# Patient Record
Sex: MALE | Race: WHITE | URBAN - METROPOLITAN AREA
[De-identification: names, ages, dates, MRNs, and addresses within clinical notes are randomized per-mention and may not be internally consistent; named-entity substitution may affect disease eponyms.]

---

## 2017-11-16 ENCOUNTER — TELEPHONE (OUTPATIENT)
Dept: DERMATOLOGY | Age: 67
End: 2017-11-16

## 2017-11-16 NOTE — TELEPHONE ENCOUNTER
Pt c/b [phone]  Pt states:   - a couple days ago   - brown in color    - he has not received a call regarding an appt reminder   - he is very upset that he is not getting in by the end of the yr   - he always makes an appt before he leave   - would like to be seen   Please call to discuss thanks  Pt wilberto first availability and on wait list

## 2017-12-11 ENCOUNTER — OFFICE VISIT (OUTPATIENT)
Dept: DERMATOLOGY | Age: 67
End: 2017-12-11

## 2017-12-11 DIAGNOSIS — L81.4 SOLAR LENTIGO: ICD-10-CM

## 2017-12-11 DIAGNOSIS — L82.1 SK (SEBORRHEIC KERATOSIS): ICD-10-CM

## 2017-12-11 DIAGNOSIS — L24.9 IRRITANT DERMATITIS: ICD-10-CM

## 2017-12-11 DIAGNOSIS — D48.5 NEOPLASM OF UNCERTAIN BEHAVIOR OF SKIN: Primary | ICD-10-CM

## 2017-12-11 DIAGNOSIS — D18.01 CHERRY ANGIOMA: ICD-10-CM

## 2017-12-11 PROCEDURE — G8421 BMI NOT CALCULATED: HCPCS | Performed by: DERMATOLOGY

## 2017-12-11 PROCEDURE — 1036F TOBACCO NON-USER: CPT | Performed by: DERMATOLOGY

## 2017-12-11 PROCEDURE — 11100 PR BIOPSY OF SKIN LESION: CPT | Performed by: DERMATOLOGY

## 2017-12-11 PROCEDURE — 99213 OFFICE O/P EST LOW 20 MIN: CPT | Performed by: DERMATOLOGY

## 2017-12-11 PROCEDURE — G8427 DOCREV CUR MEDS BY ELIG CLIN: HCPCS | Performed by: DERMATOLOGY

## 2017-12-11 PROCEDURE — 4040F PNEUMOC VAC/ADMIN/RCVD: CPT | Performed by: DERMATOLOGY

## 2017-12-11 PROCEDURE — 3017F COLORECTAL CA SCREEN DOC REV: CPT | Performed by: DERMATOLOGY

## 2017-12-11 PROCEDURE — G8484 FLU IMMUNIZE NO ADMIN: HCPCS | Performed by: DERMATOLOGY

## 2017-12-11 PROCEDURE — 1123F ACP DISCUSS/DSCN MKR DOCD: CPT | Performed by: DERMATOLOGY

## 2017-12-11 NOTE — PROGRESS NOTES
[facility] Dermatology  Althea Domingo MD  [phone]      [place]field Conquest  1950    78 y.o. male     Date of Visit: 12/11/2017    Chief Complaint: skin lesions, dermatitis    History of Present Illness:    1. He complains of a pigmented lesion on the left forearm that has gotten darker and is changing. 2.  He complains of a newly noted pigmented lesion on the upper aspect of the forehead. 3.  He complains of several growths on the extremities. 4.  He complains of several red lesions on the trunk. 5.  Follow-up for history of dermatitishas few new patches on the left leg today. He has a history of SCC on the right leg       Review of Systems:  Skin: No new or changing moles. Past Medical History, Family History, Surgical History, Medications and Allergies reviewed. Past Medical History:   Diagnosis Date    GERD (gastroesophageal reflux disease)      Past Surgical History:   Procedure Laterality Date    COLONOSCOPY  2006    colon polyp    COLONOSCOPY  09/19/2016    UPPER GASTROINTESTINAL ENDOSCOPY  09/19/2016    [place]. Allergies   Allergen Reactions    Gluten Meal Other (See Comments)    Pcn [Penicillins]      Outpatient Prescriptions Marked as Taking for the 12/11/17 encounter (Office Visit) with Nae Martinez MD   Medication Sig Dispense Refill    Coenzyme Q10 (CO Q-10 PO) Take by mouth      fluocinonide (LIDEX) 0.05 % cream Apply to affected area twice daily for up to 2 weeks or until improved.  30 g 2    magnesium oxide (MAG-OX) 400 MG tablet Take 400 mg by mouth daily      fluticasone (FLONASE) 50 MCG/ACT nasal spray 1 spray by Nasal route daily      NONFORMULARY Take 1 tablet by mouth 2 times daily Indications: tumeric      omeprazole (PRILOSEC) 40 MG delayed release capsule Take 1 capsule by mouth daily 30 capsule 3    b complex vitamins capsule Take 1 capsule by mouth daily      Cholecalciferol (VITAMIN D) 2000 UNITS CAPS capsule Take  by

## 2017-12-11 NOTE — PATIENT INSTRUCTIONS

## 2017-12-13 ENCOUNTER — TELEPHONE (OUTPATIENT)
Dept: DERMATOLOGY | Age: 67
End: 2017-12-13

## 2017-12-14 NOTE — TELEPHONE ENCOUNTER
Reviewed results of the biopsy with the patient. Date of biopsy: 12/11/17  Site of biopsy: L forearm  Result: Bowen's Disease    Plan: Curettage, pt scheduled for 1/12/18    The patient expressed understanding of the plan.

## 2018-01-12 ENCOUNTER — OFFICE VISIT (OUTPATIENT)
Dept: DERMATOLOGY | Age: 68
End: 2018-01-12

## 2018-01-12 DIAGNOSIS — D04.62 SQUAMOUS CELL CARCINOMA IN SITU OF SKIN OF LEFT UPPER ARM: Primary | ICD-10-CM

## 2018-01-12 PROCEDURE — 17262 DSTRJ MAL LES T/A/L 1.1-2.0: CPT | Performed by: DERMATOLOGY

## 2018-07-18 ENCOUNTER — OFFICE VISIT (OUTPATIENT)
Dept: DERMATOLOGY | Age: 68
End: 2018-07-18

## 2018-07-18 DIAGNOSIS — L82.1 SK (SEBORRHEIC KERATOSIS): Primary | ICD-10-CM

## 2018-07-18 DIAGNOSIS — Z85.828 HISTORY OF SCC (SQUAMOUS CELL CARCINOMA) OF SKIN: ICD-10-CM

## 2018-07-18 PROCEDURE — 3017F COLORECTAL CA SCREEN DOC REV: CPT | Performed by: DERMATOLOGY

## 2018-07-18 PROCEDURE — 1101F PT FALLS ASSESS-DOCD LE1/YR: CPT | Performed by: DERMATOLOGY

## 2018-07-18 PROCEDURE — 1123F ACP DISCUSS/DSCN MKR DOCD: CPT | Performed by: DERMATOLOGY

## 2018-07-18 PROCEDURE — G8421 BMI NOT CALCULATED: HCPCS | Performed by: DERMATOLOGY

## 2018-07-18 PROCEDURE — 1036F TOBACCO NON-USER: CPT | Performed by: DERMATOLOGY

## 2018-07-18 PROCEDURE — 4040F PNEUMOC VAC/ADMIN/RCVD: CPT | Performed by: DERMATOLOGY

## 2018-07-18 PROCEDURE — 99213 OFFICE O/P EST LOW 20 MIN: CPT | Performed by: DERMATOLOGY

## 2018-07-18 PROCEDURE — G8427 DOCREV CUR MEDS BY ELIG CLIN: HCPCS | Performed by: DERMATOLOGY

## 2018-07-18 NOTE — PROGRESS NOTES
of the hands, lower extremities with stuck on appearing verrucous brown papules and thin plaques. 2.  Left dorsum - round smooth brown patch. Assessment and Plan     1. SK (seborrheic keratosis) - multiple    Reassurance. 2. History of SCC (squamous cell carcinoma) of skin - clear    Sun protective behaviors and self skin examinations were encouraged. Call for any new or concerning lesions. Return in about 6 months (around 1/18/2019).

## 2019-01-21 ENCOUNTER — OFFICE VISIT (OUTPATIENT)
Dept: DERMATOLOGY | Age: 69
End: 2019-01-21
Payer: MEDICARE

## 2019-01-21 DIAGNOSIS — L81.4 SOLAR LENTIGO: Primary | ICD-10-CM

## 2019-01-21 DIAGNOSIS — L82.1 SK (SEBORRHEIC KERATOSIS): ICD-10-CM

## 2019-01-21 DIAGNOSIS — L11.1 GROVER'S DISEASE: ICD-10-CM

## 2019-01-21 DIAGNOSIS — Z85.828 HISTORY OF SCC (SQUAMOUS CELL CARCINOMA) OF SKIN: ICD-10-CM

## 2019-01-21 PROCEDURE — 4040F PNEUMOC VAC/ADMIN/RCVD: CPT | Performed by: DERMATOLOGY

## 2019-01-21 PROCEDURE — 1123F ACP DISCUSS/DSCN MKR DOCD: CPT | Performed by: DERMATOLOGY

## 2019-01-21 PROCEDURE — 3017F COLORECTAL CA SCREEN DOC REV: CPT | Performed by: DERMATOLOGY

## 2019-01-21 PROCEDURE — 1036F TOBACCO NON-USER: CPT | Performed by: DERMATOLOGY

## 2019-01-21 PROCEDURE — 1101F PT FALLS ASSESS-DOCD LE1/YR: CPT | Performed by: DERMATOLOGY

## 2019-01-21 PROCEDURE — G8421 BMI NOT CALCULATED: HCPCS | Performed by: DERMATOLOGY

## 2019-01-21 PROCEDURE — G8427 DOCREV CUR MEDS BY ELIG CLIN: HCPCS | Performed by: DERMATOLOGY

## 2019-01-21 PROCEDURE — G8484 FLU IMMUNIZE NO ADMIN: HCPCS | Performed by: DERMATOLOGY

## 2019-01-21 PROCEDURE — 99213 OFFICE O/P EST LOW 20 MIN: CPT | Performed by: DERMATOLOGY

## 2019-01-21 RX ORDER — TRIAMCINOLONE ACETONIDE 1 MG/G
CREAM TOPICAL
Qty: 80 G | Refills: 2 | Status: SHIPPED | OUTPATIENT
Start: 2019-01-21 | End: 2019-07-24 | Stop reason: SDUPTHER

## 2019-07-24 ENCOUNTER — OFFICE VISIT (OUTPATIENT)
Dept: DERMATOLOGY | Age: 69
End: 2019-07-24
Payer: MEDICARE

## 2019-07-24 DIAGNOSIS — D18.01 CHERRY ANGIOMA: ICD-10-CM

## 2019-07-24 DIAGNOSIS — L30.9 DERMATITIS: Primary | ICD-10-CM

## 2019-07-24 DIAGNOSIS — Z85.828 HISTORY OF SCC (SQUAMOUS CELL CARCINOMA) OF SKIN: ICD-10-CM

## 2019-07-24 DIAGNOSIS — L81.4 SOLAR LENTIGO: ICD-10-CM

## 2019-07-24 DIAGNOSIS — L82.1 SK (SEBORRHEIC KERATOSIS): ICD-10-CM

## 2019-07-24 PROCEDURE — 1036F TOBACCO NON-USER: CPT | Performed by: DERMATOLOGY

## 2019-07-24 PROCEDURE — 1123F ACP DISCUSS/DSCN MKR DOCD: CPT | Performed by: DERMATOLOGY

## 2019-07-24 PROCEDURE — 3017F COLORECTAL CA SCREEN DOC REV: CPT | Performed by: DERMATOLOGY

## 2019-07-24 PROCEDURE — G8421 BMI NOT CALCULATED: HCPCS | Performed by: DERMATOLOGY

## 2019-07-24 PROCEDURE — 4040F PNEUMOC VAC/ADMIN/RCVD: CPT | Performed by: DERMATOLOGY

## 2019-07-24 PROCEDURE — 99213 OFFICE O/P EST LOW 20 MIN: CPT | Performed by: DERMATOLOGY

## 2019-07-24 PROCEDURE — G8427 DOCREV CUR MEDS BY ELIG CLIN: HCPCS | Performed by: DERMATOLOGY

## 2019-07-24 RX ORDER — TRIAMCINOLONE ACETONIDE 1 MG/G
CREAM TOPICAL
Qty: 80 G | Refills: 2 | Status: SHIPPED | OUTPATIENT
Start: 2019-07-24 | End: 2021-07-28 | Stop reason: SDUPTHER

## 2019-07-24 NOTE — PROGRESS NOTES
daily      fluticasone (FLONASE) 50 MCG/ACT nasal spray 1 spray by Nasal route daily      NONFORMULARY Take 1 tablet by mouth 2 times daily Indications: tumeric      omeprazole (PRILOSEC) 40 MG delayed release capsule Take 1 capsule by mouth daily 30 capsule 3    b complex vitamins capsule Take 1 capsule by mouth daily      Cholecalciferol (VITAMIN D) 2000 UNITS CAPS capsule Take  by mouth. Physical Examination       The following were examined and determined to be normal: Psych/Neuro, Scalp/hair, Head/face, Conjunctivae/eyelids, Gums/teeth/lips, Neck, Breast/axilla/chest, Abdomen, Back, RUE, LUE and Nails/digits. The following were examined and determined to be abnormal: RLE and LLE. Well appearing. 1.  Lower legs with faint pink slightly scaly macules. 2.  Left temple with a stuck on appearing waxy brown papule. Left lateral chest wall with few stuck appearing oval-shaped verrucous brown papules and plaques. 3.  Extremities with well-defined round oval smooth brown patches. 4.  Left temporal scalp with a round smooth erythematous to violaceous papule. 5.  Clear. Assessment and Plan     1. Dermatitis of the legs, resolving    Triamcinolone 0.1% cream twice daily for up to 2 weeks or until improved. 2. SK (seborrheic keratosis)     Reassurance. 3. Solar lentigines    Monitor for change. 4. Cherry angioma     Reassurance. 5. History of SCC (squamous cell carcinoma) of skin - clear    Sun protective behaviors and self skin examinations were encouraged. Call for any new or concerning lesions. Return in about 1 year (around 7/24/2020).

## 2020-07-29 ENCOUNTER — OFFICE VISIT (OUTPATIENT)
Dept: DERMATOLOGY | Age: 70
End: 2020-07-29
Payer: MEDICARE

## 2020-07-29 VITALS — TEMPERATURE: 97.9 F

## 2020-07-29 PROCEDURE — 3017F COLORECTAL CA SCREEN DOC REV: CPT | Performed by: DERMATOLOGY

## 2020-07-29 PROCEDURE — G8427 DOCREV CUR MEDS BY ELIG CLIN: HCPCS | Performed by: DERMATOLOGY

## 2020-07-29 PROCEDURE — 99213 OFFICE O/P EST LOW 20 MIN: CPT | Performed by: DERMATOLOGY

## 2020-07-29 PROCEDURE — G8421 BMI NOT CALCULATED: HCPCS | Performed by: DERMATOLOGY

## 2020-07-29 PROCEDURE — 4040F PNEUMOC VAC/ADMIN/RCVD: CPT | Performed by: DERMATOLOGY

## 2020-07-29 PROCEDURE — 1123F ACP DISCUSS/DSCN MKR DOCD: CPT | Performed by: DERMATOLOGY

## 2020-07-29 PROCEDURE — 1036F TOBACCO NON-USER: CPT | Performed by: DERMATOLOGY

## 2020-07-29 NOTE — PROGRESS NOTES
[facility] Dermatology  Anila Renee MD  [phone]      Donn Clements  1950    72 y.o. male     Date of Visit: 7/29/2020    Chief Complaint: skin lesions    History of Present Illness:    1. He complains of a stable asymptomatic lesion on the left temple. 2.  Has multiple persistent unchanging pigmented lesions on the extremities. 3.  He reports few asymptomatic lesions on the right lateral wrist that appeared after a bout with poison ivy. 4.  He has a history of squamous cell carcinomas of the skin-denies any signs of recurrence. 1.2 cm SCC in situ on the left forearm-treated with curettage on 1/12/2018. He has a history of SCC on the right leg. Review of Systems:  Skin: No new or changing moles. Past Medical History, Family History, Surgical History, Medications and Allergies reviewed. Past Medical History:   Diagnosis Date    Cancer [facility])     Prostate    GERD (gastroesophageal reflux disease)      Past Surgical History:   Procedure Laterality Date    COLONOSCOPY  2006    colon polyp    COLONOSCOPY  09/19/2016    PROSTATECTOMY  01/2019 1/2019    UPPER GASTROINTESTINAL ENDOSCOPY  09/19/2016    [place]. Allergies   Allergen Reactions    Gluten Meal Other (See Comments)    Pcn [Penicillins]      Outpatient Medications Marked as Taking for the 7/29/20 encounter (Office Visit) with Dyllan Salmon MD   Medication Sig Dispense Refill    triamcinolone (KENALOG) 0.1 % cream Apply to itchy areas on the skin twice daily for up to 2 weeks or until improved.  80 g 2    Coenzyme Q10 (CO Q-10 PO) Take by mouth      magnesium oxide (MAG-OX) 400 MG tablet Take 400 mg by mouth daily      fluticasone (FLONASE) 50 MCG/ACT nasal spray 1 spray by Nasal route daily      NONFORMULARY Take 1 tablet by mouth 2 times daily Indications: tumeric      omeprazole (PRILOSEC) 40 MG delayed release capsule Take 1 capsule by mouth daily 30 capsule 3    b complex vitamins capsule Take 1 capsule by mouth daily      Cholecalciferol (VITAMIN D) 2000 UNITS CAPS capsule Take  by mouth. Physical Examination       The following were examined and determined to be normal: Psych/Neuro, Scalp/hair, Head/face, Conjunctivae/eyelids, Gums/teeth/lips, Neck, Breast/axilla/chest, Abdomen, Back, LUE, RLE, LLE and Nails/digits. The following were examined and determined to be abnormal: RUE. Well appearing. 1.  Left temple - stuck on appearing waxy brown papule. 2.  Extremities - well defined stuck on appearing round patches. 3.  Right lateral wrist - linear brown patch with multiple 1 mm smooth white papules. 4.  Clear. Assessment and Plan     1. SK (seborrheic keratosis)     Reassurance. 2. Solar lentigines    Monitor for change. 3. Milia en plaque    Reassurance. 4. History of SCC (squamous cell carcinoma) of skin - clear    Sun protective behaviors and self skin examinations were encouraged. Call for any new or concerning lesions. Return in about 1 year (around 7/29/2021).

## 2021-07-28 ENCOUNTER — OFFICE VISIT (OUTPATIENT)
Dept: DERMATOLOGY | Age: 71
End: 2021-07-28
Payer: MEDICARE

## 2021-07-28 VITALS — TEMPERATURE: 98 F

## 2021-07-28 DIAGNOSIS — L29.9 PRURITUS: Primary | ICD-10-CM

## 2021-07-28 DIAGNOSIS — D48.5 NEOPLASM OF UNCERTAIN BEHAVIOR OF SKIN: ICD-10-CM

## 2021-07-28 DIAGNOSIS — L82.1 SK (SEBORRHEIC KERATOSIS): ICD-10-CM

## 2021-07-28 DIAGNOSIS — Z85.828 HISTORY OF SCC (SQUAMOUS CELL CARCINOMA) OF SKIN: ICD-10-CM

## 2021-07-28 PROCEDURE — G8427 DOCREV CUR MEDS BY ELIG CLIN: HCPCS | Performed by: DERMATOLOGY

## 2021-07-28 PROCEDURE — 99213 OFFICE O/P EST LOW 20 MIN: CPT | Performed by: DERMATOLOGY

## 2021-07-28 PROCEDURE — 1036F TOBACCO NON-USER: CPT | Performed by: DERMATOLOGY

## 2021-07-28 PROCEDURE — 3017F COLORECTAL CA SCREEN DOC REV: CPT | Performed by: DERMATOLOGY

## 2021-07-28 PROCEDURE — 1123F ACP DISCUSS/DSCN MKR DOCD: CPT | Performed by: DERMATOLOGY

## 2021-07-28 PROCEDURE — 4040F PNEUMOC VAC/ADMIN/RCVD: CPT | Performed by: DERMATOLOGY

## 2021-07-28 PROCEDURE — 11102 TANGNTL BX SKIN SINGLE LES: CPT | Performed by: DERMATOLOGY

## 2021-07-28 PROCEDURE — G8421 BMI NOT CALCULATED: HCPCS | Performed by: DERMATOLOGY

## 2021-07-28 RX ORDER — TRIAMCINOLONE ACETONIDE 1 MG/G
CREAM TOPICAL
Qty: 80 G | Refills: 2 | Status: SHIPPED | OUTPATIENT
Start: 2021-07-28

## 2021-07-28 NOTE — PATIENT INSTRUCTIONS
Biopsy Wound Care Instructions    · Keep the bandage in place for 24 hours. · Cleanse the wound with mild soapy water daily   Gently dry the area.  Apply Vaseline or petroleum jelly to the wound using a cotton tipped applicator.  Cover with a clean bandage.  Repeat this process until the biopsy site is healed.  If you had stitches placed, continue treating the site until the stitches are removed. Remember to make an appointment to return to have your stitches removed by our staff.  You may shower and bathe as usual.       ** Biopsy results generally take around 7 business days to come back. If you have not heard from us by then, please call the office at [phone]. *Please note that biopsy results are released to both the patient and physician at the same time in [address] Please allow time for your physician to review the results. One of our staff members will reach out to you with the results and plan.

## 2021-07-30 LAB — DERMATOLOGY PATHOLOGY REPORT: ABNORMAL

## 2021-08-06 ENCOUNTER — PROCEDURE VISIT (OUTPATIENT)
Dept: DERMATOLOGY | Age: 71
End: 2021-08-06
Payer: MEDICARE

## 2021-08-06 VITALS — TEMPERATURE: 98.2 F

## 2021-08-06 DIAGNOSIS — C44.42 SCC (SQUAMOUS CELL CARCINOMA), SCALP/NECK: Primary | ICD-10-CM

## 2021-08-06 PROCEDURE — 17270 DSTR MAL LES S/N/H/F/G .5 /<: CPT | Performed by: DERMATOLOGY

## 2021-08-06 NOTE — PROGRESS NOTES
[facility] Dermatology  Tarun Levine MD  [phone]      Dillon Correa  1950    80 y.o. male     Date of Visit: 8/6/2021    Chief Complaint: SCC    History of Present Illness:    Here today for treatment of a small SCC on the right lateral neck. RESULTS   DIAGNOSIS   DIAGNOSIS:     RIGHT LATERAL NECK-     Squamous cell carcinoma, moderately differentiated     RESULTS Ramirez Pelaez MD   Electronic Signature: 30 JUL 2021 12:32 PM       Review of Systems:  Gen: Feels well, good sense of health. Past Medical History, Family History, Surgical History, Medications and Allergies reviewed. Past Medical History:   Diagnosis Date    Cancer [facility])     Prostate    GERD (gastroesophageal reflux disease)      Past Surgical History:   Procedure Laterality Date    COLONOSCOPY  2006    colon polyp    COLONOSCOPY  09/19/2016    PROSTATECTOMY  01/2019 1/2019    UPPER GASTROINTESTINAL ENDOSCOPY  09/19/2016    [place]. Allergies   Allergen Reactions    Gluten Meal Other (See Comments)    Pcn [Penicillins]      Outpatient Medications Marked as Taking for the 8/6/21 encounter (Procedure visit) with Jocelynn Couch MD   Medication Sig Dispense Refill    triamcinolone (KENALOG) 0.1 % cream Apply to itchy areas on the skin twice daily for up to 2 weeks or until improved. 80 g 2    Coenzyme Q10 (CO Q-10 PO) Take by mouth      magnesium oxide (MAG-OX) 400 MG tablet Take 400 mg by mouth daily      fluticasone (FLONASE) 50 MCG/ACT nasal spray 1 spray by Nasal route daily      NONFORMULARY Take 1 tablet by mouth 2 times daily Indications: tumeric      omeprazole (PRILOSEC) 40 MG delayed release capsule Take 1 capsule by mouth daily 30 capsule 3    b complex vitamins capsule Take 1 capsule by mouth daily      Cholecalciferol (VITAMIN D) 2000 UNITS CAPS capsule Take  by mouth. Physical Examination       Well appearing.     1. Right lateral neck - 5 mm crusted pink

## 2022-07-28 ENCOUNTER — OFFICE VISIT (OUTPATIENT)
Dept: DERMATOLOGY | Age: 72
End: 2022-07-28
Payer: MEDICARE

## 2022-07-28 DIAGNOSIS — L82.1 SK (SEBORRHEIC KERATOSIS): Primary | ICD-10-CM

## 2022-07-28 DIAGNOSIS — L81.4 SOLAR LENTIGO: ICD-10-CM

## 2022-07-28 DIAGNOSIS — Z85.828 HISTORY OF SCC (SQUAMOUS CELL CARCINOMA) OF SKIN: ICD-10-CM

## 2022-07-28 PROCEDURE — 1123F ACP DISCUSS/DSCN MKR DOCD: CPT | Performed by: DERMATOLOGY

## 2022-07-28 PROCEDURE — 99213 OFFICE O/P EST LOW 20 MIN: CPT | Performed by: DERMATOLOGY

## 2022-07-28 RX ORDER — LISINOPRIL 10 MG/1
TABLET ORAL
COMMUNITY
Start: 2022-05-01

## 2022-07-28 RX ORDER — ESOMEPRAZOLE MAGNESIUM 40 MG/1
40 FOR SUSPENSION ORAL DAILY
COMMUNITY

## 2022-07-28 NOTE — PROGRESS NOTES
[facility] Dermatology  Franklyn Saleh MD  [phone]      Everardo Lyman  1950    71 y.o. male     Date of Visit: 7/28/2022    Chief Complaint:  skin lesions    History of Present Illness:    He has several stable pigmented lesions on the neck and extremities. 2.  He reports longstanding stable lesions on the chest and left upper arm. 3.  He has a history of cutaneous SCC-denies any signs of recurrence. Small SCC on the right lateral neck-treated with electrodesiccation and curettage on 8/6/2021. 1.2 cm SCC in situ on the left forearm-treated with curettage on 1/12/2018. He has a history of SCC on the right leg. Review of Systems:  Gen: Feels well, good sense of health. Past Medical History, Family History, Surgical History, Medications and Allergies reviewed. Past Medical History:   Diagnosis Date    Cancer [facility])     Prostate    GERD (gastroesophageal reflux disease)      Past Surgical History:   Procedure Laterality Date    COLONOSCOPY  2006    colon polyp    COLONOSCOPY  09/19/2016    PROSTATECTOMY  01/2019 1/2019    UPPER GASTROINTESTINAL ENDOSCOPY  09/19/2016    [place]retts. Allergies   Allergen Reactions    Gluten Meal Other (See Comments)    Pcn [Penicillins]      Outpatient Medications Marked as Taking for the 7/28/22 encounter (Office Visit) with Joya Marie MD   Medication Sig Dispense Refill    lisinopril (PRINIVIL;ZESTRIL) 10 MG tablet TAKE ONE TABLET BY MOUTH DAILY      esomeprazole Magnesium (NEXIUM) 40 MG PACK Take 40 mg by mouth in the morning. triamcinolone (KENALOG) 0.1 % cream Apply to itchy areas on the skin twice daily for up to 2 weeks or until improved.  80 g 2    Coenzyme Q10 (CO Q-10 PO) Take by mouth      magnesium oxide (MAG-OX) 400 MG tablet Take 400 mg by mouth daily      fluticasone (FLONASE) 50 MCG/ACT nasal spray 1 spray by Nasal route daily      NONFORMULARY Take 1 tablet by mouth 2 times daily Indications: tumeric      b complex vitamins capsule Take 1 capsule by mouth daily      Cholecalciferol (VITAMIN D) 2000 UNITS CAPS capsule Take  by mouth. Physical Examination       The following were examined and determined to be normal: Psych/Neuro, Scalp/hair, Head/face, Conjunctivae/eyelids, Gums/teeth/lips, Neck, Breast/axilla/chest, Abdomen, Back, RUE, LUE, RLE, LLE, and Nails/digits. The following were examined and determined to be abnormal: None. Well-appearing. 1.  Lateral aspects of the neck, upper extremities, lower extremities with stuck on appearing verrucous tan papules and plaques. 2.  Extremities, left upper chest and left upper arm with several well-defined round smooth brown macules and patches. 3.  Clear. Assessment and Plan     1. SK (seborrheic keratosis) - multiple    Reassurance. 2. Solar lentigines    Monitor for change. Sun protective behaviors encouraged including use of at least SPF 30 or more sunscreen. 3. History of SCC (squamous cell carcinoma) of skin - clear    Sun protective behaviors, including use of at least SPF 30 sunscreen, and self skin examinations were encouraged. Call for any new or concerning lesions. Return in about 1 year (around 7/28/2023).     --Cachorro Bullock MD

## 2023-02-16 ENCOUNTER — HOSPITAL ENCOUNTER (OUTPATIENT)
Dept: GENERAL RADIOLOGY | Age: 73
Discharge: HOME | End: 2023-02-16
Payer: MEDICARE

## 2023-02-16 DIAGNOSIS — K22.70 BARRETT'S ESOPHAGUS WITHOUT DYSPLASIA: ICD-10-CM

## 2023-02-16 DIAGNOSIS — K21.00 GASTROESOPHAGEAL REFLUX DISEASE WITH ESOPHAGITIS WITHOUT HEMORRHAGE: ICD-10-CM

## 2023-02-16 PROCEDURE — 74246 X-RAY XM UPR GI TRC 2CNTRST: CPT

## 2023-04-04 ENCOUNTER — PATIENT MESSAGE (OUTPATIENT)
Dept: DERMATOLOGY | Age: 73
End: 2023-04-04

## 2023-04-05 NOTE — TELEPHONE ENCOUNTER
From: Giorgi Hammonds  To: Dr. Colt Franco  Sent: 4/4/2023 11:24 PM EDT  Subject: Spot on head    Hi dr Tana Casillas, I found this spot on my head. Its slightly raised and am wondering if this is something that should be seen before my annual visit in July. I am attaching a picture.  Thanks, jose Raymond

## 2023-04-05 NOTE — TELEPHONE ENCOUNTER
Called and left message for patient to call back office. Please offer opening for 4/6/23 if still available.

## 2023-04-06 ENCOUNTER — OFFICE VISIT (OUTPATIENT)
Dept: DERMATOLOGY | Age: 73
End: 2023-04-06
Payer: MEDICARE

## 2023-04-06 DIAGNOSIS — L82.0 INFLAMED SEBORRHEIC KERATOSIS: Primary | ICD-10-CM

## 2023-04-06 DIAGNOSIS — L81.4 SOLAR LENTIGO: ICD-10-CM

## 2023-04-06 PROCEDURE — G8427 DOCREV CUR MEDS BY ELIG CLIN: HCPCS | Performed by: DERMATOLOGY

## 2023-04-06 PROCEDURE — 1036F TOBACCO NON-USER: CPT | Performed by: DERMATOLOGY

## 2023-04-06 PROCEDURE — 17110 DESTRUCTION B9 LES UP TO 14: CPT | Performed by: DERMATOLOGY

## 2023-04-06 PROCEDURE — 99212 OFFICE O/P EST SF 10 MIN: CPT | Performed by: DERMATOLOGY

## 2023-04-06 PROCEDURE — 3017F COLORECTAL CA SCREEN DOC REV: CPT | Performed by: DERMATOLOGY

## 2023-04-06 PROCEDURE — 1123F ACP DISCUSS/DSCN MKR DOCD: CPT | Performed by: DERMATOLOGY

## 2023-04-06 PROCEDURE — G8421 BMI NOT CALCULATED: HCPCS | Performed by: DERMATOLOGY

## 2023-04-06 NOTE — PROGRESS NOTES
by mouth daily      Cholecalciferol (VITAMIN D) 2000 UNITS CAPS capsule Take  by mouth. Physical Examination       Well appearing. 1.  Left crown of the scalp - stuck on appearing verrucous pink brown papule. 2.  Upper forehead/frontal scalp - several round smooth light brown macules. Assessment and Plan     1. Inflamed seborrheic keratosis - 1    Cryotherapy was discussed and patient agreed to proceed. Consent was obtained. 1 lesions were treated cryotherapy: left crown of the scalp. 2 cycles of liquid nitrogen applied to each lesion for 5 seconds using a Deep Glint-Ac cryo spray gun. Patient was educated regarding the potential risks of blister formation and discomfort. Wound care was discussed. The patient tolerated the procedure well and there were no immediate complications. 2. Solar lentigines    Reassurance. Few treated with 2 short cycles of LN2 - discussed risk of hypopigmentation.            --Steve Drew MD

## 2023-07-27 ENCOUNTER — OFFICE VISIT (OUTPATIENT)
Dept: DERMATOLOGY | Age: 73
End: 2023-07-27
Payer: MEDICARE

## 2023-07-27 DIAGNOSIS — L81.4 SOLAR LENTIGINOSIS: Primary | ICD-10-CM

## 2023-07-27 DIAGNOSIS — Z85.828 HISTORY OF SCC (SQUAMOUS CELL CARCINOMA) OF SKIN: ICD-10-CM

## 2023-07-27 DIAGNOSIS — L82.1 SK (SEBORRHEIC KERATOSIS): ICD-10-CM

## 2023-07-27 PROCEDURE — 1036F TOBACCO NON-USER: CPT | Performed by: DERMATOLOGY

## 2023-07-27 PROCEDURE — G8421 BMI NOT CALCULATED: HCPCS | Performed by: DERMATOLOGY

## 2023-07-27 PROCEDURE — 99213 OFFICE O/P EST LOW 20 MIN: CPT | Performed by: DERMATOLOGY

## 2023-07-27 PROCEDURE — 1123F ACP DISCUSS/DSCN MKR DOCD: CPT | Performed by: DERMATOLOGY

## 2023-07-27 PROCEDURE — 3017F COLORECTAL CA SCREEN DOC REV: CPT | Performed by: DERMATOLOGY

## 2023-07-27 PROCEDURE — G8427 DOCREV CUR MEDS BY ELIG CLIN: HCPCS | Performed by: DERMATOLOGY

## 2023-07-27 NOTE — PROGRESS NOTES
[facility] Dermatology  Stephani Bernabe MD  [phone]      John Gómez  1950    68 y.o. male     Date of Visit: 7/27/2023    Chief Complaint: skin lesions    History of Present Illness:    1. He has stable freckling on the shoulders and extremities. 2.  He also reports a persistent growth on the left medial chest.    3.  He has a history of cutaneous SCC's-denies any signs of recurrence. Derm Hx:  Small SCC on the right lateral neck-treated with electrodesiccation and curettage on 8/6/2021. 1.2 cm SCC in situ on the left forearm-treated with curettage on 1/12/2018. He has a history of SCC on the right leg. Review of Systems:  Gen: Feels well, good sense of health. Past Medical History, Family History, Surgical History, Medications and Allergies reviewed. Past Medical History:   Diagnosis Date    Cancer [facility])     Prostate    GERD (gastroesophageal reflux disease)      Past Surgical History:   Procedure Laterality Date    COLONOSCOPY  2006    colon polyp    COLONOSCOPY  09/19/2016    PROSTATECTOMY  01/2019 1/2019    UPPER GASTROINTESTINAL ENDOSCOPY  09/19/2016    [place]retts. Allergies   Allergen Reactions    Pcn [Penicillins]      childhood    Gluten Meal Other (See Comments)     GI problems     Outpatient Medications Marked as Taking for the 7/27/23 encounter (Office Visit) with Anam Melchor MD   Medication Sig Dispense Refill    lisinopril (PRINIVIL;ZESTRIL) 10 MG tablet TAKE ONE TABLET BY MOUTH DAILY      esomeprazole Magnesium (NEXIUM) 40 MG PACK Take 1 packet by mouth daily      triamcinolone (KENALOG) 0.1 % cream Apply to itchy areas on the skin twice daily for up to 2 weeks or until improved.  80 g 2    Coenzyme Q10 (CO Q-10 PO) Take by mouth      magnesium oxide (MAG-OX) 400 MG tablet Take 1 tablet by mouth daily      fluticasone (FLONASE) 50 MCG/ACT nasal spray 1 spray by Nasal route daily      NONFORMULARY Take 1 tablet by mouth 2 times daily Indications:

## 2024-07-29 ENCOUNTER — OFFICE VISIT (OUTPATIENT)
Dept: DERMATOLOGY | Age: 74
End: 2024-07-29
Payer: MEDICARE

## 2024-07-29 DIAGNOSIS — Z85.828 HISTORY OF SCC (SQUAMOUS CELL CARCINOMA) OF SKIN: ICD-10-CM

## 2024-07-29 DIAGNOSIS — L82.1 SK (SEBORRHEIC KERATOSIS): Primary | ICD-10-CM

## 2024-07-29 DIAGNOSIS — R21 RASH: ICD-10-CM

## 2024-07-29 PROCEDURE — G8427 DOCREV CUR MEDS BY ELIG CLIN: HCPCS | Performed by: DERMATOLOGY

## 2024-07-29 PROCEDURE — 1036F TOBACCO NON-USER: CPT | Performed by: DERMATOLOGY

## 2024-07-29 PROCEDURE — G8421 BMI NOT CALCULATED: HCPCS | Performed by: DERMATOLOGY

## 2024-07-29 PROCEDURE — 99213 OFFICE O/P EST LOW 20 MIN: CPT | Performed by: DERMATOLOGY

## 2024-07-29 PROCEDURE — 3017F COLORECTAL CA SCREEN DOC REV: CPT | Performed by: DERMATOLOGY

## 2024-07-29 PROCEDURE — 1123F ACP DISCUSS/DSCN MKR DOCD: CPT | Performed by: DERMATOLOGY

## 2024-07-29 RX ORDER — TRIAMCINOLONE ACETONIDE 1 MG/G
CREAM TOPICAL
Qty: 80 G | Refills: 2 | Status: SHIPPED | OUTPATIENT
Start: 2024-07-29

## 2024-07-29 NOTE — PROGRESS NOTES
[facility] Dermatology  Flip Hansen MD  [phone]      Drake Reeder  1950    74 y.o. male     Date of Visit: 7/29/2024    Chief Complaint: skin lesions    History of Present Illness:    1.  He reports a persistent growth on the left anterior temporal scalp.  He has similar lesions on the upper portion of the trunk.    2.  He also reports a new onset pruritic eruption on the sacral region.  It has improved with application of triamcinolone 0.1% cream.    3.  He has a history of SCC's-denies any signs of recurrence.    Notable past medical history: Recently finished radiation for prostate cancer.    Derm Hx:  Small SCC on the right lateral neck-treated with electrodesiccation and curettage on 8/6/2021.  1.2 cm SCC in situ on the left forearm-treated with curettage on 1/12/2018.  He has a history of SCC on the right leg.         Review of Systems:  Gen: Feels well, good sense of health.    Past Medical History, Family History, Surgical History, Medications and Allergies reviewed.    Past Medical History:   Diagnosis Date    Cancer (HCC)     Prostate    GERD (gastroesophageal reflux disease)      Past Surgical History:   Procedure Laterality Date    COLONOSCOPY  2006    colon polyp    COLONOSCOPY  09/19/2016    PROSTATECTOMY  01/2019 1/2019    UPPER GASTROINTESTINAL ENDOSCOPY  09/19/2016    [facility].        Allergies   Allergen Reactions    Pcn [Penicillins]      childhood    Gluten Meal Other (See Comments)     GI problems     Outpatient Medications Marked as Taking for the 7/29/24 encounter (Office Visit) with Flip Hansen MD   Medication Sig Dispense Refill    PHOSPHATIDYLSERINE PO Take 50 mg by mouth daily      lisinopril (PRINIVIL;ZESTRIL) 10 MG tablet TAKE ONE TABLET BY MOUTH DAILY      esomeprazole Magnesium (NEXIUM) 40 MG PACK Take 1 packet by mouth daily      triamcinolone (KENALOG) 0.1 % cream Apply to itchy areas on the skin twice daily for up to 2 weeks or until improved. 80

## 2025-07-29 ENCOUNTER — OFFICE VISIT (OUTPATIENT)
Age: 75
End: 2025-07-29
Payer: MEDICARE

## 2025-07-29 DIAGNOSIS — L81.4 SOLAR LENTIGINOSIS: Primary | ICD-10-CM

## 2025-07-29 DIAGNOSIS — L82.1 SK (SEBORRHEIC KERATOSIS): ICD-10-CM

## 2025-07-29 DIAGNOSIS — Z85.828 HISTORY OF SCC (SQUAMOUS CELL CARCINOMA) OF SKIN: ICD-10-CM

## 2025-07-29 PROCEDURE — 1123F ACP DISCUSS/DSCN MKR DOCD: CPT | Performed by: DERMATOLOGY

## 2025-07-29 PROCEDURE — 3017F COLORECTAL CA SCREEN DOC REV: CPT | Performed by: DERMATOLOGY

## 2025-07-29 PROCEDURE — 1160F RVW MEDS BY RX/DR IN RCRD: CPT | Performed by: DERMATOLOGY

## 2025-07-29 PROCEDURE — 99212 OFFICE O/P EST SF 10 MIN: CPT | Performed by: DERMATOLOGY

## 2025-07-29 PROCEDURE — 1159F MED LIST DOCD IN RCRD: CPT | Performed by: DERMATOLOGY

## 2025-07-29 PROCEDURE — G8427 DOCREV CUR MEDS BY ELIG CLIN: HCPCS | Performed by: DERMATOLOGY

## 2025-07-29 PROCEDURE — 99213 OFFICE O/P EST LOW 20 MIN: CPT | Performed by: DERMATOLOGY

## 2025-07-29 PROCEDURE — G8421 BMI NOT CALCULATED: HCPCS | Performed by: DERMATOLOGY

## 2025-07-29 PROCEDURE — 1036F TOBACCO NON-USER: CPT | Performed by: DERMATOLOGY

## 2025-07-29 NOTE — PROGRESS NOTES
[facility] Dermatology  Flip Hansen MD  [phone]      rDake Reeder  1950    75 y.o. male     Date of Visit: 7/29/2025    Chief Complaint: skin lesions of concern    History of Present Illness:    1.  He has stable freckling on the extremities.     2.  He reports few growths on the upper chest and right postauricular region.      3.  He has a history of SCCs - denies any signs of recurrence.     Derm Hx:  Small SCC on the right lateral neck-treated with electrodesiccation and curettage on 8/6/2021.  1.2 cm SCC in situ on the left forearm-treated with curettage on 1/12/2018.  He has a history of SCC on the right leg.    Daughter teaching at [employer] and son works for [employer].      Review of Systems:  Gen: Feels well, good sense of health.    Past Medical History, Family History, Surgical History, Medications and Allergies reviewed.    Past Medical History:   Diagnosis Date    Cancer (HCC)     Prostate    GERD (gastroesophageal reflux disease)      Past Surgical History:   Procedure Laterality Date    COLONOSCOPY  2006    colon polyp    COLONOSCOPY  09/19/2016    PROSTATECTOMY  01/2019 1/2019    UPPER GASTROINTESTINAL ENDOSCOPY  09/19/2016    [facility].        Allergies   Allergen Reactions    Pcn [Penicillins]      childhood    Gluten Meal Other (See Comments)     GI problems     Outpatient Medications Marked as Taking for the 7/29/25 encounter (Office Visit) with Flip Hansen MD   Medication Sig Dispense Refill    PHOSPHATIDYLSERINE PO Take 50 mg by mouth daily      triamcinolone (KENALOG) 0.1 % cream Apply to itchy areas on the skin twice daily for up to 2 weeks or until improved. 80 g 2    lisinopril (PRINIVIL;ZESTRIL) 10 MG tablet TAKE ONE TABLET BY MOUTH DAILY      esomeprazole Magnesium (NEXIUM) 40 MG PACK Take 1 packet by mouth daily      Coenzyme Q10 (CO Q-10 PO) Take by mouth      magnesium oxide (MAG-OX) 400 MG tablet Take 1 tablet by mouth daily      fluticasone (FLONASE) 50  Gabrielle Elmore states they did not give options any other meds covered on insurance  Please send in new rx